# Patient Record
Sex: MALE | Race: WHITE | NOT HISPANIC OR LATINO | URBAN - METROPOLITAN AREA
[De-identification: names, ages, dates, MRNs, and addresses within clinical notes are randomized per-mention and may not be internally consistent; named-entity substitution may affect disease eponyms.]

---

## 2021-08-14 ENCOUNTER — IMMUNIZATION (OUTPATIENT)
Dept: PRIMARY CARE CLINIC | Facility: CLINIC | Age: 31
End: 2021-08-14

## 2021-08-14 DIAGNOSIS — Z23 NEED FOR VACCINATION: Primary | ICD-10-CM

## 2021-08-14 PROCEDURE — 0031A COVID-19,VECTOR-NR,RS-AD26,PF,0.5 ML DOSE VACCINE (JANSSEN): ICD-10-PCS | Mod: CV19,S$GLB,, | Performed by: FAMILY MEDICINE

## 2021-08-14 PROCEDURE — 91303 COVID-19,VECTOR-NR,RS-AD26,PF,0.5 ML DOSE VACCINE (JANSSEN): CPT | Mod: S$GLB,,, | Performed by: FAMILY MEDICINE

## 2021-08-14 PROCEDURE — 91303 COVID-19,VECTOR-NR,RS-AD26,PF,0.5 ML DOSE VACCINE (JANSSEN): ICD-10-PCS | Mod: S$GLB,,, | Performed by: FAMILY MEDICINE

## 2021-08-14 PROCEDURE — 0031A COVID-19,VECTOR-NR,RS-AD26,PF,0.5 ML DOSE VACCINE (JANSSEN): CPT | Mod: CV19,S$GLB,, | Performed by: FAMILY MEDICINE

## 2022-09-17 ENCOUNTER — OCCUPATIONAL HEALTH (OUTPATIENT)
Dept: URGENT CARE | Facility: CLINIC | Age: 32
End: 2022-09-17

## 2022-09-17 PROCEDURE — 80305 PR DRUG SCREEN - 1: ICD-10-PCS | Mod: S$GLB,,, | Performed by: EMERGENCY MEDICINE

## 2022-09-17 PROCEDURE — 80305 DRUG TEST PRSMV DIR OPT OBS: CPT | Mod: S$GLB,,, | Performed by: EMERGENCY MEDICINE

## 2024-08-22 ENCOUNTER — OFFICE VISIT (OUTPATIENT)
Dept: URGENT CARE | Facility: CLINIC | Age: 34
End: 2024-08-22

## 2024-08-22 VITALS
OXYGEN SATURATION: 99 % | HEIGHT: 69 IN | WEIGHT: 165 LBS | DIASTOLIC BLOOD PRESSURE: 78 MMHG | TEMPERATURE: 99 F | HEART RATE: 63 BPM | BODY MASS INDEX: 24.44 KG/M2 | RESPIRATION RATE: 20 BRPM | SYSTOLIC BLOOD PRESSURE: 147 MMHG

## 2024-08-22 DIAGNOSIS — J06.9 UPPER RESPIRATORY TRACT INFECTION, UNSPECIFIED TYPE: Primary | ICD-10-CM

## 2024-08-22 PROCEDURE — 99204 OFFICE O/P NEW MOD 45 MIN: CPT | Mod: S$GLB,,, | Performed by: STUDENT IN AN ORGANIZED HEALTH CARE EDUCATION/TRAINING PROGRAM

## 2024-08-22 RX ORDER — PROMETHAZINE HYDROCHLORIDE AND DEXTROMETHORPHAN HYDROBROMIDE 6.25; 15 MG/5ML; MG/5ML
5 SYRUP ORAL EVERY 4 HOURS PRN
Qty: 118 ML | Refills: 0 | Status: SHIPPED | OUTPATIENT
Start: 2024-08-22 | End: 2024-09-01

## 2024-08-22 RX ORDER — FLUTICASONE PROPIONATE 50 MCG
1 SPRAY, SUSPENSION (ML) NASAL DAILY
Qty: 11.1 ML | Refills: 0 | Status: SHIPPED | OUTPATIENT
Start: 2024-08-22

## 2024-08-22 RX ORDER — ONDANSETRON 4 MG/1
4 TABLET, ORALLY DISINTEGRATING ORAL EVERY 6 HOURS PRN
Qty: 20 TABLET | Refills: 0 | Status: SHIPPED | OUTPATIENT
Start: 2024-08-22

## 2024-08-22 NOTE — PROGRESS NOTES
"Subjective:      Patient ID: Haris Mora is a 34 y.o. male.    Vitals:  height is 5' 9" (1.753 m) and weight is 74.8 kg (165 lb). His temperature is 98.6 °F (37 °C). His blood pressure is 147/78 (abnormal) and his pulse is 63. His respiration is 20 and oxygen saturation is 99%.     Chief Complaint: Fever    Ambulatory to room with complaint of generalized body aches, generalized malaise, some mild congestion and mild nausea.    Fever   This is a new problem. The current episode started 2 days ago. The problem has been waxing and waning. The maximum temperature noted was 101 to 101.9 F. The temperature was taken using an oral thermometer. Associated symptoms include congestion, coughing, muscle aches and nausea. He has tried acetaminophen and NSAIDs for the symptoms. The treatment provided moderate relief.       Constitution: Positive for fever.   HENT:  Positive for congestion.    Respiratory:  Positive for cough.    Gastrointestinal:  Positive for nausea.      Objective:     Physical Exam   Constitutional: He is oriented to person, place, and time. He appears well-developed. He is cooperative.  Non-toxic appearance. He does not appear ill. No distress.   HENT:   Head: Normocephalic and atraumatic.   Ears:   Right Ear: Hearing and external ear normal.   Left Ear: Hearing and external ear normal.   Nose: Rhinorrhea and congestion present. No mucosal edema or nasal deformity. No epistaxis. Right sinus exhibits no maxillary sinus tenderness and no frontal sinus tenderness. Left sinus exhibits no maxillary sinus tenderness and no frontal sinus tenderness.   Mouth/Throat: Uvula is midline, oropharynx is clear and moist and mucous membranes are normal. No trismus in the jaw. Normal dentition. No uvula swelling. No oropharyngeal exudate, posterior oropharyngeal edema or posterior oropharyngeal erythema.   Eyes: Conjunctivae and lids are normal. No scleral icterus.   Neck: Trachea normal and phonation normal. Neck supple. " No edema present. No erythema present. No neck rigidity present.   Cardiovascular: Normal rate, regular rhythm, normal heart sounds and normal pulses.   Pulmonary/Chest: Effort normal and breath sounds normal. No respiratory distress. He has no decreased breath sounds. He has no rhonchi.   Abdominal: Normal appearance. He exhibits no distension. There is no abdominal tenderness. There is no rebound. No hernia.   Musculoskeletal: Normal range of motion.         General: No deformity. Normal range of motion.   Neurological: He is alert and oriented to person, place, and time. He exhibits normal muscle tone. Coordination normal.   Skin: Skin is warm, dry, intact, not diaphoretic and not pale.   Psychiatric: His speech is normal and behavior is normal. Judgment and thought content normal.   Nursing note and vitals reviewed.      Assessment:     1. Upper respiratory tract infection, unspecified type        Plan:       Upper respiratory tract infection, unspecified type    Other orders  -     ondansetron (ZOFRAN-ODT) 4 MG TbDL; Take 1 tablet (4 mg total) by mouth every 6 (six) hours as needed.  Dispense: 20 tablet; Refill: 0  -     promethazine-dextromethorphan (PROMETHAZINE-DM) 6.25-15 mg/5 mL Syrp; Take 5 mLs by mouth every 4 (four) hours as needed.  Dispense: 118 mL; Refill: 0  -     fluticasone propionate (FLONASE) 50 mcg/actuation nasal spray; 1 spray (50 mcg total) by Each Nostril route once daily.  Dispense: 11.1 mL; Refill: 0           Patient took COVID test prior to arrival it was negative.  - To ED for any new or acutely worsening symptoms including but not limited to chest pain, palpitations, shortness of breath, or fever greater than 103° F.  Patient in agreement with plan of care.    - The diagnosis, treatment plan, instructions for follow-up and reevaluation as well as ED precautions were discussed and understanding was verbalized. All questions or concerns have been addressed.  -Follow up with your primary  care provider for continued evaluation and management.

## 2024-08-22 NOTE — LETTER
August 22, 2024      [facility] [place]  [address]  [address]  [place] [address]  Phone: [phone]  Fax: [phone]       Patient: Haris Mora   YOB: 1990  Date of Visit: 08/22/2024    To Whom It May Concern:    Domenico Mora  was at [facility] on 08/22/2024. The patient may return to work/school on 08/23/2024 with no restrictions. If you have any questions or concerns, or if I can be of further assistance, please do not hesitate to contact me.    Sincerely,    Lynne Asencio MA

## 2024-12-15 ENCOUNTER — HOSPITAL ENCOUNTER (EMERGENCY)
Facility: HOSPITAL | Age: 34
Discharge: HOME | End: 2024-12-15
Attending: EMERGENCY MEDICINE

## 2024-12-15 VITALS
RESPIRATION RATE: 17 BRPM | HEART RATE: 79 BPM | OXYGEN SATURATION: 98 % | HEIGHT: 69 IN | BODY MASS INDEX: 23.7 KG/M2 | SYSTOLIC BLOOD PRESSURE: 151 MMHG | TEMPERATURE: 99 F | DIASTOLIC BLOOD PRESSURE: 84 MMHG | WEIGHT: 160 LBS

## 2024-12-15 DIAGNOSIS — K40.90 LEFT INGUINAL HERNIA: Primary | ICD-10-CM

## 2024-12-15 LAB
ALBUMIN SERPL BCP-MCNC: 4 G/DL (ref 3.5–5.2)
ALP SERPL-CCNC: 56 U/L (ref 55–135)
ALT SERPL W/O P-5'-P-CCNC: 40 U/L (ref 10–44)
ANION GAP SERPL CALC-SCNC: 3 MMOL/L (ref 8–16)
AST SERPL-CCNC: 27 U/L (ref 10–40)
BASOPHILS # BLD AUTO: 0.06 K/UL (ref 0–0.2)
BASOPHILS NFR BLD: 0.7 % (ref 0–1.9)
BILIRUB SERPL-MCNC: 0.2 MG/DL (ref 0.1–1)
BUN SERPL-MCNC: 18 MG/DL (ref 6–20)
CALCIUM SERPL-MCNC: 9 MG/DL (ref 8.7–10.5)
CHLORIDE SERPL-SCNC: 108 MMOL/L (ref 95–110)
CO2 SERPL-SCNC: 29 MMOL/L (ref 23–29)
CREAT SERPL-MCNC: 0.8 MG/DL (ref 0.5–1.4)
CREAT SERPL-MCNC: 0.8 MG/DL (ref 0.5–1.4)
DIFFERENTIAL METHOD BLD: ABNORMAL
EOSINOPHIL # BLD AUTO: 0.7 K/UL (ref 0–0.5)
EOSINOPHIL NFR BLD: 8.5 % (ref 0–8)
ERYTHROCYTE [DISTWIDTH] IN BLOOD BY AUTOMATED COUNT: 12.6 % (ref 11.5–14.5)
EST. GFR  (NO RACE VARIABLE): >60 ML/MIN/1.73 M^2
GLUCOSE SERPL-MCNC: 96 MG/DL (ref 70–110)
HCT VFR BLD AUTO: 42 % (ref 40–54)
HGB BLD-MCNC: 14.3 G/DL (ref 14–18)
IMM GRANULOCYTES # BLD AUTO: 0.02 K/UL (ref 0–0.04)
IMM GRANULOCYTES NFR BLD AUTO: 0.2 % (ref 0–0.5)
LIPASE SERPL-CCNC: 30 U/L (ref 4–60)
LYMPHOCYTES # BLD AUTO: 2.4 K/UL (ref 1–4.8)
LYMPHOCYTES NFR BLD: 29.1 % (ref 18–48)
MCH RBC QN AUTO: 29.8 PG (ref 27–31)
MCHC RBC AUTO-ENTMCNC: 34 G/DL (ref 32–36)
MCV RBC AUTO: 88 FL (ref 82–98)
MONOCYTES # BLD AUTO: 0.6 K/UL (ref 0.3–1)
MONOCYTES NFR BLD: 7.3 % (ref 4–15)
NEUTROPHILS # BLD AUTO: 4.5 K/UL (ref 1.8–7.7)
NEUTROPHILS NFR BLD: 54.2 % (ref 38–73)
NRBC BLD-RTO: 0 /100 WBC
PLATELET # BLD AUTO: 271 K/UL (ref 150–450)
PMV BLD AUTO: 9.9 FL (ref 9.2–12.9)
POTASSIUM SERPL-SCNC: 3.8 MMOL/L (ref 3.5–5.1)
PROT SERPL-MCNC: 6.5 G/DL (ref 6–8.4)
RBC # BLD AUTO: 4.8 M/UL (ref 4.6–6.2)
SAMPLE: NORMAL
SODIUM SERPL-SCNC: 140 MMOL/L (ref 136–145)
WBC # BLD AUTO: 8.34 K/UL (ref 3.9–12.7)

## 2024-12-15 PROCEDURE — 99285 EMERGENCY DEPT VISIT HI MDM: CPT | Mod: 25

## 2024-12-15 PROCEDURE — 80053 COMPREHEN METABOLIC PANEL: CPT

## 2024-12-15 PROCEDURE — 86803 HEPATITIS C AB TEST: CPT | Performed by: EMERGENCY MEDICINE

## 2024-12-15 PROCEDURE — 25500020 PHARM REV CODE 255

## 2024-12-15 PROCEDURE — 87389 HIV-1 AG W/HIV-1&-2 AB AG IA: CPT | Performed by: EMERGENCY MEDICINE

## 2024-12-15 PROCEDURE — 82565 ASSAY OF CREATININE: CPT

## 2024-12-15 PROCEDURE — 83690 ASSAY OF LIPASE: CPT

## 2024-12-15 PROCEDURE — 85025 COMPLETE CBC W/AUTO DIFF WBC: CPT

## 2024-12-15 RX ADMIN — IOHEXOL 100 ML: 350 INJECTION, SOLUTION INTRAVENOUS at 07:12

## 2024-12-15 NOTE — Clinical Note
"Haris"Genoveva Mora was seen and treated in our emergency department on 12/15/2024.  He may return to work on 12/17/2024.       If you have any questions or concerns, please don't hesitate to call.      Ita Quintero PA-C"

## 2024-12-16 ENCOUNTER — TELEPHONE (OUTPATIENT)
Dept: GASTROENTEROLOGY | Facility: CLINIC | Age: 34
End: 2024-12-16

## 2024-12-16 ENCOUNTER — TELEPHONE (OUTPATIENT)
Dept: SURGERY | Facility: CLINIC | Age: 34
End: 2024-12-16

## 2024-12-16 LAB
HCV AB SERPL QL IA: NEGATIVE
HIV 1+2 AB+HIV1 P24 AG SERPL QL IA: NEGATIVE

## 2024-12-16 NOTE — ED PROVIDER NOTES
Encounter Date: 12/15/2024       History     Chief Complaint   Patient presents with    Inguinal Hernia     Pt has inguinal hernia on left side, has been using a belt but since yesterday his pain has been increased.      34-year-old male no significant past medical history presents to the emergency department with a left inguinal hernia that was diagnosed in March of 2024.  Patient has seen his primary care provider multiple times for this problem.  His primary care provider suggested surgery however the patient stated he did not have time off from work to have the surgery performed.  The hernia has caused him intermittent pain for the last couple of months.  He reports that the intensity of the pain is the same today however it is now radiating to her neck.  Describes it as a sharp pain that comes and goes.  There is nothing that makes it better or worse.  He denies any urinary symptoms, fever, nausea, vomiting.        Review of patient's allergies indicates:   Allergen Reactions    Omnicef [cefdinir] Hives     History reviewed. No pertinent past medical history.  History reviewed. No pertinent surgical history.  No family history on file.  Social History     Tobacco Use    Smoking status: Unknown     Review of Systems   Constitutional: Negative.    Respiratory: Negative.     Cardiovascular: Negative.    Gastrointestinal:  Positive for abdominal pain.   Skin: Negative.        Physical Exam     Initial Vitals [12/15/24 1831]   BP Pulse Resp Temp SpO2   (!) 151/84 79 17 99.1 °F (37.3 °C) 98 %      MAP       --         Physical Exam    Vitals reviewed.  Constitutional: He appears well-developed and well-nourished. He is not diaphoretic. No distress.   HENT:   Head: Atraumatic.   Eyes: Conjunctivae and EOM are normal. Right eye exhibits no discharge. Left eye exhibits no discharge.   Cardiovascular:  Normal rate, regular rhythm and normal heart sounds.           Pulmonary/Chest: Breath sounds normal. No respiratory  distress. He has no wheezes. He has no rhonchi. He has no rales.   Abdominal: Abdomen is soft. He exhibits no distension. There is no abdominal tenderness. There is no rebound and no guarding.   Genitourinary:    Genitourinary Comments: Left inguinal hernia. Reducible. No bruising, signs of incarceration or strangulation.           Neurological: He is alert. GCS score is 15. GCS eye subscore is 4. GCS verbal subscore is 5. GCS motor subscore is 6.   Skin: Skin is warm. Capillary refill takes less than 2 seconds.         ED Course   Procedures  Labs Reviewed   CBC W/ AUTO DIFFERENTIAL - Abnormal       Result Value    WBC 8.34      RBC 4.80      Hemoglobin 14.3      Hematocrit 42.0      MCV 88      MCH 29.8      MCHC 34.0      RDW 12.6      Platelets 271      MPV 9.9      Immature Granulocytes 0.2      Gran # (ANC) 4.5      Immature Grans (Abs) 0.02      Lymph # 2.4      Mono # 0.6      Eos # 0.7 (*)     Baso # 0.06      nRBC 0      Gran % 54.2      Lymph % 29.1      Mono % 7.3      Eosinophil % 8.5 (*)     Basophil % 0.7      Differential Method Automated     COMPREHENSIVE METABOLIC PANEL - Abnormal    Sodium 140      Potassium 3.8      Chloride 108      CO2 29      Glucose 96      BUN 18      Creatinine 0.8      Calcium 9.0      Total Protein 6.5      Albumin 4.0      Total Bilirubin 0.2      Alkaline Phosphatase 56      AST 27      ALT 40      eGFR >60.0      Anion Gap 3 (*)    LIPASE    Lipase 30     HEPATITIS C ANTIBODY   HIV 1 / 2 ANTIBODY   ISTAT CREATININE    POC Creatinine 0.8      Sample VENOUS     POCT CREATININE          Imaging Results              CT Abdomen Pelvis With IV Contrast NO Oral Contrast (Final result)  Result time 12/15/24 19:59:45      Final result by Kendall Hillman MD (12/15/24 19:59:45)                   Impression:      Moderate sized fat containing left inguinal hernia.  No bowel contents contained within the hernia sac.  Clinical correlation with physical examination for  reducibility/incarceration advised.    Additional incidental findings as above.      Electronically signed by: Kendall Hillman MD  Date:    12/15/2024  Time:    19:59               Narrative:    EXAMINATION:  CT ABDOMEN PELVIS WITH IV CONTRAST    CLINICAL HISTORY:  Hernia, complicated;    TECHNIQUE:  Low dose axial images, sagittal and coronal reformations were obtained from the lung bases to the pubic symphysis following the IV administration of 100 mL of Omnipaque 350 .  Oral contrast was not given.    COMPARISON:  None.    FINDINGS:  The lung bases are unremarkable.  There is no pleural fluid present.  The visualized portions of the heart appear normal.    The liver is at the upper limits of normal for size.  No focal hepatic abnormality appreciated.  The portal vein is patent.  The gallbladder shows no evidence of stones or pericholecystic fluid.  There is no intra-or extrahepatic biliary ductal dilatation.    The stomach, spleen, pancreas, and adrenal glands are unremarkable.    The kidneys are normal in size and location and enhance symmetrically.  There is no evidence of hydronephrosis. The urinary bladder is unremarkable. The prostate demonstrates no significant abnormality.    The abdominal aorta is normal in course and caliber without significant atherosclerotic calcifications.    The visualized loops of small and large bowel show no evidence of obstruction or inflammation. There is no CT evidence of acute appendicitis. There is no ascites, free fluid, or intraperitoneal free air. There are scattered shotty mesenteric and retroperitoneal lymph nodes.  There is a tiny fat containing umbilical hernia.  There is a moderate-sized fat containing left inguinal hernia.  No bowel contents identified within the hernia sac.    There is a left-sided L5 pars interarticularis defect.  Osseous structures appear otherwise intact.  The extraperitoneal soft tissues are unremarkable.                                        Medications   iohexoL (OMNIPAQUE 350) injection 100 mL (100 mLs Intravenous Given 12/15/24 1947)     Medical Decision Making  34-year-old male no significant past medical history presents to the emergency department with a left inguinal hernia that was diagnosed in March of 2024.  Patient has seen his primary care provider multiple times for this problem.  His primary care provider suggested surgery however the patient stated he did not have time off from work to have the surgery performed.  The hernia has caused him intermittent pain for the last couple of months.  He reports that the intensity of the pain is the same today however it is now radiating to her neck.  Describes it as a sharp pain that comes and goes.  There is nothing that makes it better or worse.  He denies any urinary symptoms, fever, nausea, vomiting.    Considerations include but not limited to reducible hernia, incarcerated hernia, strangulated hernia, intra-abdominal pathology    Vitals stable.  Patient afebrile.  He is well-appearing on physical exam.  Nontoxic and in no acute distress. My attending performed the examination of the inguinal hernia. Hernia was reducible without surrounding erythema, ecchymosis, or swelling. CT showed moderate-sized fat containing left inguinal hernia.  No bowel contents contained within the hernia sac.  I do not believe that further workup, imaging, admission is indicated at this time.  Patient will follow up with outpatient surgery regarding his visit here today.  He was given strict return precautions.  He verbalizes understanding of the plan and agreed.  Plan also discussed with my attending and all questions were answered at the bedside.    Amount and/or Complexity of Data Reviewed  Labs: ordered.  Radiology: ordered.    Risk  Prescription drug management.                                      Clinical Impression:  Final diagnoses:  [K40.90] Left inguinal hernia (Primary)          ED Disposition Condition     Discharge Stable          ED Prescriptions    None       Follow-up Information       Follow up With Specialties Details Why Contact Info    Zabrina Roe MD Internal Medicine Call   [address] [place] [address] [phone]      [facility]   [address] [place] [address] [phone]             Ita Quintero, PA-C  12/15/24 2116

## 2024-12-16 NOTE — TELEPHONE ENCOUNTER
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 Can we please check on this and call pt back to advise. Thank  You

## 2024-12-16 NOTE — DISCHARGE INSTRUCTIONS
Please follow up with outpatient surgery regarding your visit here today. I have sent a referral and you should receive a phone call in the next three days to set up an appointment. You can take ibuprofen for pain and continue to wear your belt. If your pain increases, you develop brusing, nausea or vomiting please return to the ED

## 2024-12-16 NOTE — TELEPHONE ENCOUNTER
----- Message from Elen sent at 12/16/2024  1:04 PM CST -----  Regarding: repair hernia  Contact: pt  Type:  Sooner Appointment Request    Caller is requesting a sooner appointment.  Caller declined first available appointment listed below.  Caller will not accept being placed on the waitlist and is requesting a message be sent to doctor.    Name of Caller:  patient   When is the first available appointment?    Symptoms:  repair hernia consult   Would the patient rather a call back or a response via [facility]?   Best Call Back Number:  [phone]    Additional Information:  call to schedule the following thanks

## 2024-12-16 NOTE — TELEPHONE ENCOUNTER
Returned call to the patient, patient notified that our providers do not repair Hernias, patient was given [phone] and told to notify the phone staff that he needed to get in with the general surgery clinic, patient verbalized understanding of this.

## 2024-12-18 ENCOUNTER — OFFICE VISIT (OUTPATIENT)
Dept: SURGERY | Facility: CLINIC | Age: 34
End: 2024-12-18

## 2024-12-18 VITALS — SYSTOLIC BLOOD PRESSURE: 135 MMHG | HEART RATE: 83 BPM | TEMPERATURE: 99 F | DIASTOLIC BLOOD PRESSURE: 81 MMHG

## 2024-12-18 DIAGNOSIS — R10.31 RIGHT GROIN PAIN: ICD-10-CM

## 2024-12-18 DIAGNOSIS — K40.90 LEFT INGUINAL HERNIA: Primary | ICD-10-CM

## 2024-12-18 PROCEDURE — 99214 OFFICE O/P EST MOD 30 MIN: CPT | Mod: PBBFAC,PN | Performed by: SURGERY

## 2024-12-18 PROCEDURE — 99204 OFFICE O/P NEW MOD 45 MIN: CPT | Mod: S$PBB,,, | Performed by: SURGERY

## 2024-12-18 PROCEDURE — 99999 PR PBB SHADOW E&M-EST. PATIENT-LVL IV: CPT | Mod: PBBFAC,,, | Performed by: SURGERY

## 2024-12-18 RX ORDER — CLINDAMYCIN PHOSPHATE 900 MG/50ML
900 INJECTION, SOLUTION INTRAVENOUS
OUTPATIENT
Start: 2024-12-18

## 2024-12-18 NOTE — H&P
GENERAL SURGERY  OUTPATIENT H&P    REASON FOR VISIT/CC: Left inguinal hernia    HPI: Haris Mora is a 34 y.o. male here for evaluation of left inguinal hernia.  Patient developed a bulge and discomfort a few months ago.  Recently led him to the emergency room where labs were unremarkable.  CT imaging confirmed fat containing hernia on the left. He has a decent amount of discomfort especially when the hernia bulges out. This has worsened by activity, coughing, etc.. Also notices some right groin pain with coughing and strenuous activity but no bulge. No previous surgeries. No obstructive.    I have reviewed the patient's chart including prior progress notes, procedures and testing.     ROS:   Review of Systems    PROBLEM LIST:  There is no problem list on file for this patient.        HISTORY  No past medical history on file.    No past surgical history on file.    Social History     Tobacco Use    Smoking status: Former     Current packs/day: 0.00     Types: Cigarettes     Quit date:      Years since quittin.9       No family history on file.      MEDS:  Current Outpatient Medications on File Prior to Visit   Medication Sig Dispense Refill    [DISCONTINUED] fluticasone propionate (FLONASE) 50 mcg/actuation nasal spray 1 spray (50 mcg total) by Each Nostril route once daily. 11.1 mL 0    [DISCONTINUED] ondansetron (ZOFRAN-ODT) 4 MG TbDL Take 1 tablet (4 mg total) by mouth every 6 (six) hours as needed. 20 tablet 0     No current facility-administered medications on file prior to visit.       ALLERGIES:  Review of patient's allergies indicates:   Allergen Reactions    Omnicef [cefdinir] Hives         VITALS:  Vitals:    24 1325   BP: 135/81   Pulse: 83   Temp: 98.8 °F (37.1 °C)         PHYSICAL EXAM:  Physical Exam  Vitals reviewed.   Constitutional:       General: He is not in acute distress.     Appearance: Normal appearance. He is well-developed.   HENT:      Head: Normocephalic and atraumatic.  "  Eyes:      General: No scleral icterus.  Neck:      Trachea: No tracheal deviation.   Cardiovascular:      Rate and Rhythm: Normal rate and regular rhythm.      Pulses: Normal pulses.   Pulmonary:      Effort: Pulmonary effort is normal. No respiratory distress.      Breath sounds: Normal breath sounds.   Abdominal:      General: There is no distension.      Palpations: Abdomen is soft.      Tenderness: There is no abdominal tenderness.      Hernia: A hernia (Obvious left inguinal hernia, suspicious right inguinal hernia with Valsalva) is present.   Musculoskeletal:         General: No swelling or tenderness. Normal range of motion.      Cervical back: Normal range of motion and neck supple. No rigidity.   Skin:     General: Skin is warm and dry.      Coloration: Skin is not jaundiced.      Findings: No erythema.   Neurological:      General: No focal deficit present.      Mental Status: He is alert and oriented to person, place, and time. He is not disoriented.      Motor: No weakness or abnormal muscle tone.   Psychiatric:         Mood and Affect: Mood normal.         Behavior: Behavior normal.         Thought Content: Thought content normal.         Judgment: Judgment normal.           LABS:  Lab Results   Component Value Date    WBC 8.34 12/15/2024    RBC 4.80 12/15/2024    HGB 14.3 12/15/2024    HCT 42.0 12/15/2024     12/15/2024     Lab Results   Component Value Date    GLU 96 12/15/2024     12/15/2024    K 3.8 12/15/2024     12/15/2024    CO2 29 12/15/2024    BUN 18 12/15/2024    CREATININE 0.8 12/15/2024    CALCIUM 9.0 12/15/2024     Lab Results   Component Value Date    ALT 40 12/15/2024    AST 27 12/15/2024    ALKPHOS 56 12/15/2024    BILITOT 0.2 12/15/2024     No results found for: "MG", "PHOS"    STUDIES:  Images and reports were personally reviewed.  FINDINGS:  The lung bases are unremarkable.  There is no pleural fluid present.  The visualized portions of the heart appear normal.   "   The liver is at the upper limits of normal for size.  No focal hepatic abnormality appreciated.  The portal vein is patent.  The gallbladder shows no evidence of stones or pericholecystic fluid.  There is no intra-or extrahepatic biliary ductal dilatation.     The stomach, spleen, pancreas, and adrenal glands are unremarkable.     The kidneys are normal in size and location and enhance symmetrically.  There is no evidence of hydronephrosis. The urinary bladder is unremarkable. The prostate demonstrates no significant abnormality.     The abdominal aorta is normal in course and caliber without significant atherosclerotic calcifications.     The visualized loops of small and large bowel show no evidence of obstruction or inflammation. There is no CT evidence of acute appendicitis. There is no ascites, free fluid, or intraperitoneal free air. There are scattered shotty mesenteric and retroperitoneal lymph nodes.  There is a tiny fat containing umbilical hernia.  There is a moderate-sized fat containing left inguinal hernia.  No bowel contents identified within the hernia sac.     There is a left-sided L5 pars interarticularis defect.  Osseous structures appear otherwise intact.  The extraperitoneal soft tissues are unremarkable.     Impression:     Moderate sized fat containing left inguinal hernia.  No bowel contents contained within the hernia sac.  Clinical correlation with physical examination for reducibility/incarceration advised.      ASSESSMENT & PLAN:  34 y.o. male with symptomatic left inguinal hernia, suspicious right inguinal hernia  -obvious left inguinal hernia which is symptomatic  -based off exam I suspicious for possible occult right inguinal hernia  -I discussed treatment options, specifically discussed open versus robotic repair with or without mesh of the left inguinal hernia, possible right, my recommendation is for robotic left inguinal hernia repair with mesh and if right inguinal hernias  identified we could repaired at the same time especially since the patient has occasional pain in the right groin with straining and coughing   -risks of the surgery including pain, bleeding scarring, infection, recurrence, seroma, hematoma, chronic groin pain, decreased fertility, etc. reviewed, patient expressed understanding these risks   -will schedule for robotic repair of left versus bilateral inguinal hernias on 01/06/2025   -labs are up-to-date

## 2024-12-31 ENCOUNTER — PATIENT MESSAGE (OUTPATIENT)
Dept: SURGERY | Facility: CLINIC | Age: 34
End: 2024-12-31